# Patient Record
Sex: FEMALE | Race: BLACK OR AFRICAN AMERICAN | Employment: FULL TIME | URBAN - METROPOLITAN AREA
[De-identification: names, ages, dates, MRNs, and addresses within clinical notes are randomized per-mention and may not be internally consistent; named-entity substitution may affect disease eponyms.]

---

## 2021-12-15 LAB
ANTIBODY SCREEN, EXTERNAL: NEGATIVE
CHLAMYDIA, EXTERNAL: NEGATIVE
GTT 60 MIN, EXTERNAL: NORMAL
HBSAG, EXTERNAL: NEGATIVE
HGB, EXTERNAL: 14.3
HIV, EXTERNAL: NEGATIVE
N. GONORRHEA, EXTERNAL: NEGATIVE
RPR, EXTERNAL: NEGATIVE
RUBELLA, EXTERNAL: NORMAL
TYPE, ABO & RH, EXTERNAL: NORMAL

## 2022-01-06 LAB
CYSTIC FIBROSIS, EXTERNAL: NORMAL
NIPT, EXTERNAL: NORMAL

## 2022-01-28 LAB — AFP, MATERNAL, EXTERNAL: NORMAL

## 2022-02-28 VITALS
WEIGHT: 272.38 LBS | BODY MASS INDEX: 41.28 KG/M2 | HEIGHT: 68 IN | DIASTOLIC BLOOD PRESSURE: 70 MMHG | SYSTOLIC BLOOD PRESSURE: 120 MMHG

## 2022-02-28 PROBLEM — O34.30 CERVICAL CERCLAGE SUTURE PRESENT: Status: ACTIVE | Noted: 2022-01-07

## 2022-02-28 PROBLEM — E66.01 OBESITY, MORBID, BMI 40.0-49.9 (HCC): Status: ACTIVE | Noted: 2022-02-28

## 2022-02-28 PROBLEM — E55.9 VITAMIN D DEFICIENCY: Status: ACTIVE | Noted: 2021-12-15

## 2022-02-28 RX ORDER — SULFAMETHOXAZOLE AND TRIMETHOPRIM 800; 160 MG/1; MG/1
TABLET ORAL
COMMUNITY
Start: 2021-12-15

## 2022-02-28 NOTE — PROGRESS NOTES
Problems  H/o 23 week loss short cervix  Cerclage place 14 weeks  SC Bleed Resolved  Vit D Def 15 -- 5k  SMA Carrier -- FOB Negative  Obesity BMI 41

## 2022-03-07 ENCOUNTER — INITIAL PRENATAL (OUTPATIENT)
Dept: OBGYN CLINIC | Age: 27
End: 2022-03-07

## 2022-03-07 VITALS — BODY MASS INDEX: 42.12 KG/M2 | SYSTOLIC BLOOD PRESSURE: 134 MMHG | DIASTOLIC BLOOD PRESSURE: 77 MMHG | WEIGHT: 277 LBS

## 2022-03-07 DIAGNOSIS — O26.872 SHORT CERVICAL LENGTH DURING PREGNANCY IN SECOND TRIMESTER: ICD-10-CM

## 2022-03-07 DIAGNOSIS — O34.32 CERVICAL CERCLAGE SUTURE PRESENT IN SECOND TRIMESTER: Primary | ICD-10-CM

## 2022-03-07 DIAGNOSIS — Z3A.23 PREGNANCY WITH 23 COMPLETED WEEKS GESTATION: ICD-10-CM

## 2022-03-07 PROCEDURE — 0502F SUBSEQUENT PRENATAL CARE: CPT | Performed by: OBSTETRICS & GYNECOLOGY

## 2022-03-07 NOTE — PROGRESS NOTES
US today shows short Cervix 1.25 cerclage in place. Funnellilng  Suboptimal views will need  scan  Bedrest amap stressed.   Seated at home work     Problems  H/o 23 week loss short cervix  Cerclage in place 14 weeks  SC Bleed Resolved  Vit D Def 15 -- 5k  SMA Carrier -- FOB Negative  Obesity BMI 41  Girl Stephen

## 2022-03-07 NOTE — PROGRESS NOTES
FETAL SURVEY  DIFFICULT SCAN DUE TO MATERNAL BODY HABITUS. DECREASED SCAN CLARITY DUE TO BMI > 40. A SINGLE VIABLE IUP AT 23W1D GA BY LMP IS SEEN. FETAL CARDIAC MOTION OBSERVED. FETAL ANATOMY POORLY VISUALIZED. FACE, CSP, LAT VENT, CER/CM, CP, SPINE, KIDNEYS, STOMACH/DIAPHRAGM, BLADDER, 3VC, CORD INSERTION,  LVOT, 4CH, 3VV, ARMS, LEGS. ANATOMY NOT SEEN: AO, DA, RVOT, NOSE/LIPS, PROFILE, HANDS, FEET. APPROPRIATE FETAL GROWTH IS SEEN. SIZE=DATES. BORIS AND PLACENTA APPEAR WITHIN NORMAL LIMITS. CERVIX APPEARS SHORT AND MEASURES 1.25CM. THE CERVIX APPEARS TO FUNNEL.  FETAL SCAN RECOMMENDED.   GENDER: XX

## 2022-03-08 ENCOUNTER — TELEPHONE (OUTPATIENT)
Dept: OBGYN CLINIC | Age: 27
End: 2022-03-08

## 2022-03-08 NOTE — TELEPHONE ENCOUNTER
You  Vane Cardona Just now (12:54 PM)     Adelaida De Luna MD  You 15 minutes ago (12:39 PM)     GM    No I think 4 weeks is good for the scan but I do want to see her in the office in 2 weeks to check her stitch again on exam.        This nurse attempted to reach the patient and left a detailed message about MD recommendations and to call back prn

## 2022-03-08 NOTE — TELEPHONE ENCOUNTER
Call received at 11:00am      32year old patient  21 w2d pregnant seen in the office yesterday      Patient reports she is being seen in two weeks in the office and currently has ultrasound with MFM in four weeks    Patient is wondering if she should be seeing MFM in 2 weeks in stead    Please advise    Thank you

## 2022-03-11 ENCOUNTER — TELEPHONE (OUTPATIENT)
Dept: OBGYN CLINIC | Age: 27
End: 2022-03-11

## 2022-03-11 NOTE — TELEPHONE ENCOUNTER
Call received at 4:00PM      32year old patient last seen in the office on 3/7/2022     Patient is G3 23 w5d pregnant last seen in the office on 3/7/2022  [facility] calling to say that the patient is there with rom and have sent requests for medical records        Medical records sent as per Dr. Nataly Jackson to provided fax number of [phone]   Fax confirmation received

## 2022-03-11 NOTE — TELEPHONE ENCOUNTER
L&D called to say patient arrived there with ruptured membranes and will be admitted for management and delivery.

## 2022-03-18 PROBLEM — E55.9 VITAMIN D DEFICIENCY: Status: ACTIVE | Noted: 2021-12-15

## 2022-03-19 PROBLEM — O34.30 CERVICAL CERCLAGE SUTURE PRESENT: Status: ACTIVE | Noted: 2022-01-07

## 2022-03-19 PROBLEM — E66.01 OBESITY, MORBID, BMI 40.0-49.9 (HCC): Status: ACTIVE | Noted: 2022-02-28

## 2022-03-19 PROBLEM — O26.872 SHORT CERVICAL LENGTH DURING PREGNANCY IN SECOND TRIMESTER: Status: ACTIVE | Noted: 2022-03-07

## 2024-06-12 ENCOUNTER — APPOINTMENT (OUTPATIENT)
Facility: HOSPITAL | Age: 29
End: 2024-06-12
Payer: MEDICAID

## 2024-06-12 ENCOUNTER — HOSPITAL ENCOUNTER (EMERGENCY)
Facility: HOSPITAL | Age: 29
Discharge: HOME | End: 2024-06-12
Attending: EMERGENCY MEDICINE
Payer: MEDICAID

## 2024-06-12 VITALS
WEIGHT: 261 LBS | BODY MASS INDEX: 38.66 KG/M2 | RESPIRATION RATE: 18 BRPM | HEART RATE: 81 BPM | DIASTOLIC BLOOD PRESSURE: 89 MMHG | TEMPERATURE: 98.6 F | SYSTOLIC BLOOD PRESSURE: 132 MMHG | HEIGHT: 69 IN | OXYGEN SATURATION: 99 %

## 2024-06-12 DIAGNOSIS — M54.50 RIGHT-SIDED LOW BACK PAIN WITHOUT SCIATICA, UNSPECIFIED CHRONICITY: Primary | ICD-10-CM

## 2024-06-12 LAB
APPEARANCE UR: CLEAR
BACTERIA URNS QL MICRO: ABNORMAL /HPF
BILIRUB UR QL: NEGATIVE
COLOR UR: ABNORMAL
EPITH CASTS URNS QL MICRO: ABNORMAL /LPF
GLUCOSE UR STRIP.AUTO-MCNC: NEGATIVE MG/DL
HCG UR QL: NEGATIVE
HGB UR QL STRIP: NEGATIVE
KETONES UR QL STRIP.AUTO: NEGATIVE MG/DL
LEUKOCYTE ESTERASE UR QL STRIP.AUTO: NEGATIVE
MUCOUS THREADS URNS QL MICRO: ABNORMAL /LPF
NITRITE UR QL STRIP.AUTO: NEGATIVE
PH UR STRIP: 6 (ref 5–8)
PROT UR STRIP-MCNC: NEGATIVE MG/DL
RBC #/AREA URNS HPF: ABNORMAL /HPF
SP GR UR REFRACTOMETRY: 1.02 (ref 1–1.03)
SPECIMEN HOLD: NORMAL
UROBILINOGEN UR QL STRIP.AUTO: 0.2 EU/DL (ref 0.2–1)
WBC URNS QL MICRO: ABNORMAL /HPF (ref 0–4)

## 2024-06-12 PROCEDURE — 72100 X-RAY EXAM L-S SPINE 2/3 VWS: CPT

## 2024-06-12 PROCEDURE — 81001 URINALYSIS AUTO W/SCOPE: CPT

## 2024-06-12 PROCEDURE — 81025 URINE PREGNANCY TEST: CPT

## 2024-06-12 PROCEDURE — 87086 URINE CULTURE/COLONY COUNT: CPT

## 2024-06-12 PROCEDURE — 6360000002 HC RX W HCPCS: Performed by: PHYSICIAN ASSISTANT

## 2024-06-12 PROCEDURE — 96372 THER/PROPH/DIAG INJ SC/IM: CPT

## 2024-06-12 PROCEDURE — 99284 EMERGENCY DEPT VISIT MOD MDM: CPT

## 2024-06-12 RX ORDER — KETOROLAC TROMETHAMINE 30 MG/ML
30 INJECTION, SOLUTION INTRAMUSCULAR; INTRAVENOUS
Status: COMPLETED | OUTPATIENT
Start: 2024-06-12 | End: 2024-06-12

## 2024-06-12 RX ORDER — LIDOCAINE 50 MG/G
1 PATCH TOPICAL EVERY 24 HOURS
Qty: 30 PATCH | Refills: 0 | Status: SHIPPED | OUTPATIENT
Start: 2024-06-12 | End: 2024-07-12

## 2024-06-12 RX ORDER — NAPROXEN 500 MG/1
500 TABLET ORAL 2 TIMES DAILY PRN
Qty: 20 TABLET | Refills: 0 | Status: SHIPPED | OUTPATIENT
Start: 2024-06-12 | End: 2024-06-22

## 2024-06-12 RX ADMIN — KETOROLAC TROMETHAMINE 30 MG: 30 INJECTION, SOLUTION INTRAMUSCULAR at 15:17

## 2024-06-12 ASSESSMENT — PAIN - FUNCTIONAL ASSESSMENT
PAIN_FUNCTIONAL_ASSESSMENT: 0-10

## 2024-06-12 ASSESSMENT — PAIN DESCRIPTION - PAIN TYPE: TYPE: ACUTE PAIN

## 2024-06-12 ASSESSMENT — PAIN SCALES - GENERAL
PAINLEVEL_OUTOF10: 9
PAINLEVEL_OUTOF10: 2
PAINLEVEL_OUTOF10: 4

## 2024-06-12 ASSESSMENT — LIFESTYLE VARIABLES
HOW MANY STANDARD DRINKS CONTAINING ALCOHOL DO YOU HAVE ON A TYPICAL DAY: PATIENT DOES NOT DRINK
HOW OFTEN DO YOU HAVE A DRINK CONTAINING ALCOHOL: NEVER

## 2024-06-12 ASSESSMENT — PAIN DESCRIPTION - ORIENTATION: ORIENTATION: LOWER

## 2024-06-12 ASSESSMENT — PAIN DESCRIPTION - LOCATION: LOCATION: BACK

## 2024-06-12 NOTE — ED TRIAGE NOTES
To ED with c/o low back pain s/p lifting injury x 2 days ago.  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  States could feel the pain yesterday, but was much worse when she woke up today.  No OTC meds today.  Denies any radiation to lower extremities.

## 2024-06-12 NOTE — ED PROVIDER NOTES
EMERGENCY DEPARTMENT PHYSICIAN NOTE     Patient: Pat Bowden     Time of Service: 2024  2:15 PM     Chief complaint:   Chief Complaint   Patient presents with    Back Pain        HISTORY:  Patient is a 28 y.o. female who presents to the emergency department with complaints of right lower back discomfort.  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  States after doing this she noticed some pain in her lower back.  No specific injury.  States yesterday she went [place] an amusement park that seems to have exacerbated the symptoms.  Pain is worse with movement and bending.  No numbness or tingling.  No loss of bowel or bladder.  Denies fever, chills, chest pain, shortness of breath, abdominal pain or urinary symptoms.      Past Medical History:   Diagnosis Date    Cervical cerclage suture present 2022    Genetic carrier     SMA Carrier    Obesity, morbid, BMI 40.0-49.9 ([facility]) 2022     delivery 2021    @23 weeks Short Cervix Dr. Corona     Vitamin D deficiency 12/15/2021    15        Past Surgical History:   Procedure Laterality Date    GYN          GYN  2022    Schilling Cervical Cerclage    HERNIA REPAIR          Family History   Problem Relation Age of Onset    Colon Cancer Maternal Grandmother         Social History     Socioeconomic History    Marital status:    Tobacco Use    Smoking status: Never    Smokeless tobacco: Never   Substance and Sexual Activity    Alcohol use: Not Currently    Drug use: Never        Current Medications: Reviewed in chart.    Allergies:   Allergies   Allergen Reactions    Adhesive Tape Swelling          REVIEW OF SYSTEMS: See HPI for pertinent positives and negatives.      PHYSICAL EXAM:  /89   Pulse 81   Temp 98.6 °F (37 °C) (Oral)   Resp 18   Ht 1.753 m (5' 9\")   Wt 118.4 kg (261 lb)   SpO2 99%   BMI 38.54 kg/m²    Physical Exam  Vitals and nursing note reviewed.

## 2024-06-13 LAB
BACTERIA SPEC CULT: NORMAL
CC UR VC: NORMAL
SERVICE CMNT-IMP: NORMAL